# Patient Record
Sex: MALE | Race: WHITE | NOT HISPANIC OR LATINO | Employment: UNEMPLOYED | URBAN - METROPOLITAN AREA
[De-identification: names, ages, dates, MRNs, and addresses within clinical notes are randomized per-mention and may not be internally consistent; named-entity substitution may affect disease eponyms.]

---

## 2021-03-05 ENCOUNTER — HOSPITAL ENCOUNTER (EMERGENCY)
Facility: HOSPITAL | Age: 21
Discharge: HOME | End: 2021-03-05
Attending: EMERGENCY MEDICINE | Admitting: EMERGENCY MEDICINE
Payer: COMMERCIAL

## 2021-03-05 ENCOUNTER — APPOINTMENT (EMERGENCY)
Dept: CT IMAGING | Facility: HOSPITAL | Age: 21
End: 2021-03-05
Payer: COMMERCIAL

## 2021-03-05 ENCOUNTER — APPOINTMENT (EMERGENCY)
Dept: RADIOLOGY | Facility: HOSPITAL | Age: 21
End: 2021-03-05
Payer: COMMERCIAL

## 2021-03-05 VITALS
OXYGEN SATURATION: 98 % | BODY MASS INDEX: 37.75 KG/M2 | DIASTOLIC BLOOD PRESSURE: 82 MMHG | WEIGHT: 310 LBS | SYSTOLIC BLOOD PRESSURE: 142 MMHG | RESPIRATION RATE: 16 BRPM | TEMPERATURE: 98 F | HEIGHT: 76 IN | HEART RATE: 77 BPM

## 2021-03-05 DIAGNOSIS — M54.9 BACK PAIN: Primary | ICD-10-CM

## 2021-03-05 DIAGNOSIS — R07.9 CHEST PAIN: ICD-10-CM

## 2021-03-05 LAB
ALBUMIN SERPL BCP-MCNC: 4.7 G/DL (ref 3.5–5.7)
ALP SERPL-CCNC: 74 U/L (ref 40–150)
ALT SERPL W P-5'-P-CCNC: 41 U/L (ref 7–52)
ANION GAP SERPL CALCULATED.3IONS-SCNC: 6 MMOL/L (ref 4–13)
AST SERPL W P-5'-P-CCNC: 21 U/L (ref 13–39)
ATRIAL RATE: 72 BPM
BASOPHILS # BLD AUTO: 0.1 THOUSANDS/ΜL (ref 0–0.1)
BASOPHILS NFR BLD AUTO: 1 % (ref 0–2)
BILIRUB SERPL-MCNC: 0.4 MG/DL (ref 0.2–1)
BUN SERPL-MCNC: 12 MG/DL (ref 7–25)
CALCIUM SERPL-MCNC: 9.2 MG/DL (ref 8.6–10.5)
CHLORIDE SERPL-SCNC: 103 MMOL/L (ref 98–107)
CO2 SERPL-SCNC: 28 MMOL/L (ref 21–31)
CREAT SERPL-MCNC: 0.98 MG/DL (ref 0.7–1.3)
D DIMER PPP FEU-MCNC: 0.53 UG/ML FEU
EOSINOPHIL # BLD AUTO: 0.3 THOUSAND/ΜL (ref 0–0.61)
EOSINOPHIL NFR BLD AUTO: 4 % (ref 0–5)
ERYTHROCYTE [DISTWIDTH] IN BLOOD BY AUTOMATED COUNT: 12.6 % (ref 11.5–14.5)
GFR SERPL CREATININE-BSD FRML MDRD: 110 ML/MIN/1.73SQ M
GLUCOSE SERPL-MCNC: 105 MG/DL (ref 65–99)
HCT VFR BLD AUTO: 48.6 % (ref 42–47)
HGB BLD-MCNC: 16.8 G/DL (ref 14–18)
LIPASE SERPL-CCNC: <10 U/L (ref 11–82)
LYMPHOCYTES # BLD AUTO: 1.6 THOUSANDS/ΜL (ref 0.6–4.47)
LYMPHOCYTES NFR BLD AUTO: 22 % (ref 21–51)
MAGNESIUM SERPL-MCNC: 2 MG/DL (ref 1.9–2.7)
MCH RBC QN AUTO: 30.8 PG (ref 26–34)
MCHC RBC AUTO-ENTMCNC: 34.6 G/DL (ref 31–37)
MCV RBC AUTO: 89 FL (ref 81–99)
MONOCYTES # BLD AUTO: 0.7 THOUSAND/ΜL (ref 0.17–1.22)
MONOCYTES NFR BLD AUTO: 10 % (ref 2–12)
NEUTROPHILS # BLD AUTO: 4.4 THOUSANDS/ΜL (ref 1.4–6.5)
NEUTS SEG NFR BLD AUTO: 63 % (ref 42–75)
P AXIS: 47 DEGREES
PLATELET # BLD AUTO: 261 THOUSANDS/UL (ref 149–390)
PMV BLD AUTO: 7.8 FL (ref 8.6–11.7)
POTASSIUM SERPL-SCNC: 4 MMOL/L (ref 3.5–5.5)
PR INTERVAL: 168 MS
PROT SERPL-MCNC: 7.2 G/DL (ref 6.4–8.9)
QRS AXIS: 37 DEGREES
QRSD INTERVAL: 90 MS
QT INTERVAL: 390 MS
QTC INTERVAL: 427 MS
RBC # BLD AUTO: 5.45 MILLION/UL (ref 4.3–5.9)
SODIUM SERPL-SCNC: 137 MMOL/L (ref 134–143)
T WAVE AXIS: 2 DEGREES
TROPONIN I SERPL-MCNC: <0.03 NG/ML
VENTRICULAR RATE: 72 BPM
WBC # BLD AUTO: 7.1 THOUSAND/UL (ref 4.8–10.8)

## 2021-03-05 PROCEDURE — 93005 ELECTROCARDIOGRAM TRACING: CPT

## 2021-03-05 PROCEDURE — 99285 EMERGENCY DEPT VISIT HI MDM: CPT | Performed by: EMERGENCY MEDICINE

## 2021-03-05 PROCEDURE — G1004 CDSM NDSC: HCPCS

## 2021-03-05 PROCEDURE — 83690 ASSAY OF LIPASE: CPT | Performed by: EMERGENCY MEDICINE

## 2021-03-05 PROCEDURE — 83735 ASSAY OF MAGNESIUM: CPT | Performed by: EMERGENCY MEDICINE

## 2021-03-05 PROCEDURE — 80053 COMPREHEN METABOLIC PANEL: CPT | Performed by: EMERGENCY MEDICINE

## 2021-03-05 PROCEDURE — 85379 FIBRIN DEGRADATION QUANT: CPT | Performed by: EMERGENCY MEDICINE

## 2021-03-05 PROCEDURE — 36415 COLL VENOUS BLD VENIPUNCTURE: CPT | Performed by: EMERGENCY MEDICINE

## 2021-03-05 PROCEDURE — 84484 ASSAY OF TROPONIN QUANT: CPT | Performed by: EMERGENCY MEDICINE

## 2021-03-05 PROCEDURE — 99284 EMERGENCY DEPT VISIT MOD MDM: CPT

## 2021-03-05 PROCEDURE — 85025 COMPLETE CBC W/AUTO DIFF WBC: CPT | Performed by: EMERGENCY MEDICINE

## 2021-03-05 PROCEDURE — 71275 CT ANGIOGRAPHY CHEST: CPT

## 2021-03-05 PROCEDURE — 93010 ELECTROCARDIOGRAM REPORT: CPT | Performed by: INTERNAL MEDICINE

## 2021-03-05 RX ORDER — BUPROPION HYDROCHLORIDE 300 MG/1
300 TABLET ORAL DAILY
COMMUNITY

## 2021-03-05 RX ADMIN — IOHEXOL 100 ML: 350 INJECTION, SOLUTION INTRAVENOUS at 13:36

## 2021-03-05 NOTE — DISCHARGE INSTRUCTIONS
Your CT scan was reassuring  Follow up with your PCP  Return if symptoms worsen or if new symptoms develop or if you have any other concerns

## 2021-03-05 NOTE — ED PROVIDER NOTES
History  Chief Complaint   Patient presents with    Back Pain     began 4 days ago when patient woke up  no recognized injury     Patient is a very pleasant 28-year-old male presenting with complaint of midthoracic back pain which started approximately 4 days ago  He describes pain that radiates into his back which is worse with movement, and no associated with shortness of breath  He denies nausea, vomiting, abdominal pain  Denies any injury, change in activity  No PE, DVT risk factors are significant family history of cardiac disease at a young age  No IVDA, alcohol  No bowel/ bladder difficulties, saddle anesthesia, fever, chills, weakness/numbness, IVDA  Back Pain  Location:  Thoracic spine  Quality:  Aching  Pain severity:  No pain  Pain is:  Same all the time  Onset quality:  Gradual  Duration:  4 days  Timing:  Constant  Relieved by:  Nothing  Worsened by:  Twisting  Associated symptoms: no abdominal pain, no chest pain, no dysuria, no fever, no numbness and no weakness        Prior to Admission Medications   Prescriptions Last Dose Informant Patient Reported? Taking? buPROPion (Wellbutrin XL) 300 mg 24 hr tablet   Yes Yes   Sig: Take 300 mg by mouth daily      Facility-Administered Medications: None       History reviewed  No pertinent past medical history  History reviewed  No pertinent surgical history  History reviewed  No pertinent family history  I have reviewed and agree with the history as documented  E-Cigarette/Vaping    E-Cigarette Use Never User      E-Cigarette/Vaping Substances     Social History     Tobacco Use    Smoking status: Current Every Day Smoker     Packs/day: 0 50    Smokeless tobacco: Never Used   Substance Use Topics    Alcohol use: Not Currently    Drug use: Yes     Types: Marijuana       Review of Systems   Constitutional: Negative for chills and fever  HENT: Negative for congestion, nosebleeds, rhinorrhea and sore throat      Eyes: Negative for pain and visual disturbance  Respiratory: Negative for cough and wheezing  Cardiovascular: Negative for chest pain and leg swelling  Gastrointestinal: Negative for abdominal distention, abdominal pain, diarrhea, nausea and vomiting  Genitourinary: Negative for dysuria and frequency  Musculoskeletal: Positive for back pain  Negative for joint swelling  Skin: Negative for rash and wound  Neurological: Negative for weakness and numbness  Psychiatric/Behavioral: Negative for decreased concentration and suicidal ideas  Physical Exam  Physical Exam  Vitals signs and nursing note reviewed  Constitutional:       Appearance: He is well-developed  HENT:      Head: Normocephalic and atraumatic  Eyes:      Conjunctiva/sclera: Conjunctivae normal       Pupils: Pupils are equal, round, and reactive to light  Neck:      Musculoskeletal: Normal range of motion and neck supple  Trachea: No tracheal deviation  Cardiovascular:      Rate and Rhythm: Normal rate and regular rhythm  Heart sounds: Normal heart sounds  No murmur  Pulmonary:      Effort: Pulmonary effort is normal  No respiratory distress  Breath sounds: Normal breath sounds  No wheezing or rales  Abdominal:      General: Bowel sounds are normal  There is no distension  Palpations: Abdomen is soft  Tenderness: There is no abdominal tenderness  Musculoskeletal:         General: No tenderness or deformity  Skin:     General: Skin is warm and dry  Capillary Refill: Capillary refill takes less than 2 seconds  Neurological:      Mental Status: He is alert and oriented to person, place, and time  Sensory: No sensory deficit     Psychiatric:         Judgment: Judgment normal          Vital Signs  ED Triage Vitals [03/05/21 1141]   Temperature Pulse Respirations Blood Pressure SpO2   98 °F (36 7 °C) 81 16 166/94 99 %      Temp Source Heart Rate Source Patient Position - Orthostatic VS BP Location FiO2 (%) Temporal Monitor Sitting Left arm --      Pain Score       8           Vitals:    03/05/21 1141 03/05/21 1421   BP: 166/94 142/82   Pulse: 81 77   Patient Position - Orthostatic VS: Sitting          Visual Acuity      ED Medications  Medications   iohexol (OMNIPAQUE) 350 MG/ML injection (MULTI-DOSE) 100 mL (100 mL Intravenous Given 3/5/21 1336)       Diagnostic Studies  Results Reviewed     Procedure Component Value Units Date/Time    Lipase [[id number]]  (Abnormal) Collected: 03/05/21 1227    Lab Status: Final result Specimen: Blood from Arm, Left Updated: 03/05/21 1306     Lipase <10 u/L     Comprehensive metabolic panel [[id number]]  (Abnormal) Collected: 03/05/21 1227    Lab Status: Final result Specimen: Blood from Arm, Left Updated: 03/05/21 1306     Sodium 137 mmol/L      Potassium 4 0 mmol/L      Chloride 103 mmol/L      CO2 28 mmol/L      ANION GAP 6 mmol/L      BUN 12 mg/dL      Creatinine 0 98 mg/dL      Glucose 105 mg/dL      Calcium 9 2 mg/dL      AST 21 U/L      ALT 41 U/L      Alkaline Phosphatase 74 U/L      Total Protein 7 2 g/dL      Albumin 4 7 g/dL      Total Bilirubin 0 40 mg/dL      eGFR 110 ml/min/1 73sq m     Narrative:      [place] guidelines for Chronic Kidney Disease (CKD):     Stage 1 with normal or high GFR (GFR > 90 mL/min/1 73 square meters)    Stage 2 Mild CKD (GFR = 60-89 mL/min/1 73 square meters)    Stage 3A Moderate CKD (GFR = 45-59 mL/min/1 73 square meters)    Stage 3B Moderate CKD (GFR = 30-44 mL/min/1 73 square meters)    Stage 4 Severe CKD (GFR = 15-29 mL/min/1 73 square meters)    Stage 5 End Stage CKD (GFR <15 mL/min/1 73 square meters)  Note: GFR calculation is accurate only with a steady state creatinine    D-Dimer [[id number]]  (Abnormal) Collected: 03/05/21 1227    Lab Status: Final result Specimen: Blood from Arm, Left Updated: 03/05/21 1300     D-Dimer, Quant 0 53 ug/ml FEU     Magnesium [[id number]]  (Normal) Collected: 03/05/21 1227 Lab Status: Final result Specimen: Blood from Arm, Left Updated: 03/05/21 1258     Magnesium 2 0 mg/dL     Troponin I [[id number]]  (Normal) Collected: 03/05/21 1227    Lab Status: Final result Specimen: Blood from Arm, Left Updated: 03/05/21 1255     Troponin I <0 03 ng/mL     CBC and differential [[id number]]  (Abnormal) Collected: 03/05/21 1227    Lab Status: Final result Specimen: Blood from Arm, Left Updated: 03/05/21 1241     WBC 7 10 Thousand/uL      RBC 5 45 Million/uL      Hemoglobin 16 8 g/dL      Hematocrit 48 6 %      MCV 89 fL      MCH 30 8 pg      MCHC 34 6 g/dL      RDW 12 6 %      MPV 7 8 fL      Platelets 761 Thousands/uL      Neutrophils Relative 63 %      Lymphocytes Relative 22 %      Monocytes Relative 10 %      Eosinophils Relative 4 %      Basophils Relative 1 %      Neutrophils Absolute 4 40 Thousands/µL      Lymphocytes Absolute 1 60 Thousands/µL      Monocytes Absolute 0 70 Thousand/µL      Eosinophils Absolute 0 30 Thousand/µL      Basophils Absolute 0 10 Thousands/µL                  CTA ED chest PE study   Final Result by Mary Guerrero MD (03/05 1411)      No pulmonary embolism                    Workstation performed: [id number]                    Procedures  ECG 12 Lead Documentation Only    Date/Time: 3/5/2021 12:32 PM  Performed by: Mat Beard DO  Authorized by: Mat Beard DO     Indications / Diagnosis:  CP  Patient location:  ED  Interpretation:     Interpretation: normal    Rate:     ECG rate:  72  Rhythm:     Rhythm: sinus rhythm    Ectopy:     Ectopy: none    QRS:     QRS axis:  Normal  Conduction:     Conduction: normal    ST segments:     ST segments:  Normal  T waves:     T waves: inverted      Inverted:  AVF             ED Course             HEART Risk Score      Most Recent Value   Heart Score Risk Calculator   History  0 Filed at: 03/05/2021 1226   ECG  0 Filed at: 03/05/2021 1226   Age  0 Filed at: 03/05/2021 1226   Risk Factors  1 Filed at: 03/05/2021 1226   Troponin  0 Filed at: 03/05/2021 1226   HEART Score  1 Filed at: 03/05/2021 1226              PERC Rule for PE      Most Recent Value   PERC Rule for PE   Age >=50  0 Filed at: 03/05/2021 1225   HR >=100  0 Filed at: 03/05/2021 1225   O2 Sat on room air < 95%  0 Filed at: 03/05/2021 1225   History of PE or DVT  0 Filed at: 03/05/2021 1225   Recent trauma or surgery  0 Filed at: 03/05/2021 1225   Hemoptysis  0 Filed at: 03/05/2021 1225   Exogenous estrogen  0 Filed at: 03/05/2021 1225   Unilateral leg swelling  0 Filed at: 03/05/2021 1225   PERC Rule for PE Results  0 Filed at: 03/05/2021 1225                  Wells' Criteria for PE      Most Recent Value   Wells' Criteria for PE   Clinical signs and symptoms of DVT  0 Filed at: 03/05/2021 1225   PE is primary diagnosis or equally likely  3 Filed at: 03/05/2021 1225   HR >100  0 Filed at: 03/05/2021 1225   Immobilization at least 3 days or Surgery in the previous 4 weeks  0 Filed at: 03/05/2021 1225   Previous, objectively diagnosed PE or DVT  0 Filed at: 03/05/2021 1225   Hemoptysis  0 Filed at: 03/05/2021 1225   Malignancy with treatment within 6 months or palliative  0 Filed at: 03/05/2021 1225   Wells' Criteria Total  3 Filed at: 03/05/2021 1225                MDM  Number of Diagnoses or Management Options  Back pain: new and requires workup  Chest pain: new and requires workup  Diagnosis management comments: Patient with ileus onset of midthoracic and chest pain associated with shortness of breath  No injury to suggest musculoskeletal cause however, 23year-old this is often the most likely etiology  He really has no significant PE risk factors, however I am unable to explain the symptoms without ruling out PE  Following these PE rule out criteria which is 98% sensitive, my suspicion is higher to warrant DDimer testing         Amount and/or Complexity of Data Reviewed  Review and summarize past medical records: yes  Independent visualization of images, tracings, or specimens: yes    Risk of Complications, Morbidity, and/or Mortality  Presenting problems: high  Diagnostic procedures: minimal  Management options: low        Disposition  Final diagnoses:   Back pain   Chest pain     Time reflects when diagnosis was documented in both MDM as applicable and the Disposition within this note     Time User Action Codes Description Comment    3/5/2021  2:15 PM Krista Nolan Add [M54 9] Back pain     3/5/2021  2:15 PM Krista Nolan Add [R07 9] Chest pain       ED Disposition     ED Disposition Condition Date/Time Comment    Discharge Stable Fri Mar 5, 2021  2:15 PM Lesley Michele discharge to home/self care  Follow-up Information     Follow up With Specialties Details Why Contact Info    Jason Riley DO Family Medicine Schedule an appointment as soon as possible for a visit   [facility]  [phone]            Discharge Medication List as of 3/5/2021  2:16 PM      CONTINUE these medications which have NOT CHANGED    Details   buPROPion (Wellbutrin XL) 300 mg 24 hr tablet Take 300 mg by mouth daily, Historical Med           No discharge procedures on file      PDMP Review     None          ED Provider  Electronically Signed by           Ghassan Chanel DO  03/05/21 5898

## 2021-04-06 ENCOUNTER — IMMUNIZATIONS (OUTPATIENT)
Dept: FAMILY MEDICINE CLINIC | Facility: HOSPITAL | Age: 21
End: 2021-04-06

## 2021-04-06 DIAGNOSIS — Z23 ENCOUNTER FOR IMMUNIZATION: Primary | ICD-10-CM

## 2021-04-06 PROCEDURE — 0011A SARS-COV-2 / COVID-19 MRNA VACCINE (MODERNA) 100 MCG: CPT

## 2021-04-06 PROCEDURE — 91301 SARS-COV-2 / COVID-19 MRNA VACCINE (MODERNA) 100 MCG: CPT

## 2021-05-05 ENCOUNTER — IMMUNIZATIONS (OUTPATIENT)
Dept: FAMILY MEDICINE CLINIC | Facility: HOSPITAL | Age: 21
End: 2021-05-05

## 2021-05-05 DIAGNOSIS — Z23 ENCOUNTER FOR IMMUNIZATION: Primary | ICD-10-CM

## 2021-05-05 PROCEDURE — 0012A SARS-COV-2 / COVID-19 MRNA VACCINE (MODERNA) 100 MCG: CPT

## 2021-05-05 PROCEDURE — 91301 SARS-COV-2 / COVID-19 MRNA VACCINE (MODERNA) 100 MCG: CPT

## 2021-12-12 ENCOUNTER — APPOINTMENT (EMERGENCY)
Dept: RADIOLOGY | Facility: HOSPITAL | Age: 21
End: 2021-12-12
Payer: COMMERCIAL

## 2021-12-12 ENCOUNTER — HOSPITAL ENCOUNTER (EMERGENCY)
Facility: HOSPITAL | Age: 21
Discharge: HOME | End: 2021-12-12
Admitting: INTERNAL MEDICINE
Payer: COMMERCIAL

## 2021-12-12 VITALS
WEIGHT: 310 LBS | HEIGHT: 76 IN | BODY MASS INDEX: 37.75 KG/M2 | SYSTOLIC BLOOD PRESSURE: 188 MMHG | DIASTOLIC BLOOD PRESSURE: 73 MMHG | RESPIRATION RATE: 16 BRPM | TEMPERATURE: 97.7 F | OXYGEN SATURATION: 96 % | HEART RATE: 87 BPM

## 2021-12-12 DIAGNOSIS — M94.0 COSTOCHONDRITIS, ACUTE: ICD-10-CM

## 2021-12-12 DIAGNOSIS — R07.81 RIB PAIN: Primary | ICD-10-CM

## 2021-12-12 PROCEDURE — 71101 X-RAY EXAM UNILAT RIBS/CHEST: CPT

## 2021-12-12 PROCEDURE — 99283 EMERGENCY DEPT VISIT LOW MDM: CPT

## 2021-12-12 PROCEDURE — 96372 THER/PROPH/DIAG INJ SC/IM: CPT

## 2021-12-12 PROCEDURE — 99284 EMERGENCY DEPT VISIT MOD MDM: CPT | Performed by: INTERNAL MEDICINE

## 2021-12-12 RX ORDER — KETOROLAC TROMETHAMINE 30 MG/ML
30 INJECTION, SOLUTION INTRAMUSCULAR; INTRAVENOUS ONCE
Status: COMPLETED | OUTPATIENT
Start: 2021-12-12 | End: 2021-12-12

## 2021-12-12 RX ORDER — PREDNISONE 20 MG/1
TABLET ORAL
Qty: 15 TABLET | Refills: 0 | Status: SHIPPED | OUTPATIENT
Start: 2021-12-12 | End: 2021-12-22

## 2021-12-12 RX ADMIN — KETOROLAC TROMETHAMINE 30 MG: 30 INJECTION, SOLUTION INTRAMUSCULAR at 23:19

## 2022-01-28 ENCOUNTER — OFFICE VISIT (OUTPATIENT)
Dept: URGENT CARE | Facility: CLINIC | Age: 22
End: 2022-01-28
Payer: COMMERCIAL

## 2022-01-28 VITALS
BODY MASS INDEX: 37.75 KG/M2 | HEART RATE: 81 BPM | RESPIRATION RATE: 18 BRPM | WEIGHT: 310 LBS | TEMPERATURE: 97.6 F | OXYGEN SATURATION: 99 % | HEIGHT: 76 IN

## 2022-01-28 DIAGNOSIS — K04.7 DENTAL INFECTION: Primary | ICD-10-CM

## 2022-01-28 PROCEDURE — 99213 OFFICE O/P EST LOW 20 MIN: CPT | Performed by: PHYSICIAN ASSISTANT

## 2022-01-28 RX ORDER — AMOXICILLIN 875 MG/1
875 TABLET, COATED ORAL 2 TIMES DAILY
Qty: 20 TABLET | Refills: 0 | Status: SHIPPED | OUTPATIENT
Start: 2022-01-28 | End: 2022-02-07

## 2022-01-28 RX ORDER — ALBUTEROL SULFATE 90 UG/1
2 AEROSOL, METERED RESPIRATORY (INHALATION) EVERY 4 HOURS PRN
COMMUNITY
Start: 2021-08-23 | End: 2022-08-23

## 2022-01-28 NOTE — PROGRESS NOTES
[facility]    NAME: Jasmeet eDan is a 24 y o  male  : 2000    MRN: [id number]  DATE: 2022  TIME: 12:18 PM    Assessment and Plan   Dental infection [K04 7]  1  Dental infection  amoxicillin (AMOXIL) 875 mg tablet       Patient Instructions     Patient Instructions   Antibiotic as directed  Ibuprofen and/or tylenol for pain  Heating pad  Follow up with dentist       Chief Complaint     Chief Complaint   Patient presents with    Dental Pain     tooth pain started yesterday upper left side        History of Present Illness   19-year-old male here with complaint dental pain  Pain is on the left upper side  Has not been to his dentist for quite some time  Sensitive to hot and cold  Hurts when he chews  Review of Systems   Review of Systems   Constitutional: Negative for chills, fatigue and fever  HENT: Positive for dental problem  Negative for congestion, ear pain, postnasal drip, sinus pressure and sore throat  Respiratory: Negative for cough, shortness of breath and wheezing  Neurological: Negative for headaches  All other systems reviewed and are negative  Current Medications     Current Outpatient Medications:     albuterol (PROVENTIL HFA,VENTOLIN HFA) 90 mcg/act inhaler, Inhale 2 puffs every 4 (four) hours as needed, Disp: , Rfl:     buPROPion (Wellbutrin XL) 300 mg 24 hr tablet, Take 300 mg by mouth daily, Disp: , Rfl:     amoxicillin (AMOXIL) 875 mg tablet, Take 1 tablet (875 mg total) by mouth 2 (two) times a day for 10 days, Disp: 20 tablet, Rfl: 0    Current Allergies     Allergies as of 2022 - Reviewed 2022   Allergen Reaction Noted    Pollen extract Eye Swelling 2020          The following portions of the patient's history were reviewed and updated as appropriate: allergies, current medications, past family history, past medical history, past social history, past surgical history and problem list    History reviewed   No pertinent past medical history  History reviewed  No pertinent surgical history  Family History   Problem Relation Age of Onset    No Known Problems Father      Social History     Socioeconomic History    Marital status: Single     Spouse name: Not on file    Number of children: Not on file    Years of education: Not on file    Highest education level: Not on file   Occupational History    Not on file   Tobacco Use    Smoking status: Former Smoker     Packs/day: 0 50    Smokeless tobacco: Never Used   Vaping Use    Vaping Use: Never used   Substance and Sexual Activity    Alcohol use: Not Currently    Drug use: Yes     Types: Marijuana    Sexual activity: Not on file   Other Topics Concern    Not on file   Social History Narrative    Not on file     Social Determinants of Health     Financial Resource Strain: Not on file   Food Insecurity: Not on file   Transportation Needs: Not on file   Physical Activity: Not on file   Stress: Not on file   Social Connections: Not on file   Intimate Partner Violence: Not on file   Housing Stability: Not on file     Medications have been verified  Objective   Pulse 81   Temp 97 6 °F (36 4 °C)   Resp 18   Ht 6' 4" (1 93 m)   Wt (!) 141 kg (310 lb)   SpO2 99%   BMI 37 73 kg/m²      Physical Exam   Physical Exam  Vitals reviewed  Constitutional:       General: He is not in acute distress  Appearance: He is well-developed  HENT:      Head: Normocephalic and atraumatic  Right Ear: Tympanic membrane normal       Left Ear: Tympanic membrane normal       Nose: Nose normal  No mucosal edema  Mouth/Throat:     Cardiovascular:      Rate and Rhythm: Normal rate and regular rhythm  Heart sounds: Normal heart sounds  Pulmonary:      Effort: Pulmonary effort is normal  No respiratory distress  Breath sounds: Normal breath sounds